# Patient Record
Sex: MALE | Race: WHITE | NOT HISPANIC OR LATINO
[De-identification: names, ages, dates, MRNs, and addresses within clinical notes are randomized per-mention and may not be internally consistent; named-entity substitution may affect disease eponyms.]

---

## 2023-04-27 PROBLEM — Z00.00 ENCOUNTER FOR PREVENTIVE HEALTH EXAMINATION: Status: ACTIVE | Noted: 2023-04-27

## 2023-04-28 ENCOUNTER — NON-APPOINTMENT (OUTPATIENT)
Age: 53
End: 2023-04-28

## 2023-05-15 ENCOUNTER — APPOINTMENT (OUTPATIENT)
Dept: PULMONOLOGY | Facility: CLINIC | Age: 53
End: 2023-05-15
Payer: COMMERCIAL

## 2023-05-15 VITALS
HEART RATE: 63 BPM | HEIGHT: 73 IN | SYSTOLIC BLOOD PRESSURE: 122 MMHG | DIASTOLIC BLOOD PRESSURE: 74 MMHG | WEIGHT: 235 LBS | BODY MASS INDEX: 31.14 KG/M2 | RESPIRATION RATE: 16 BRPM | OXYGEN SATURATION: 97 %

## 2023-05-15 DIAGNOSIS — G47.33 OBSTRUCTIVE SLEEP APNEA (ADULT) (PEDIATRIC): ICD-10-CM

## 2023-05-15 DIAGNOSIS — Z87.39 PERSONAL HISTORY OF OTHER DISEASES OF THE MUSCULOSKELETAL SYSTEM AND CONNECTIVE TISSUE: ICD-10-CM

## 2023-05-15 DIAGNOSIS — K21.9 GASTRO-ESOPHAGEAL REFLUX DISEASE W/OUT ESOPHAGITIS: ICD-10-CM

## 2023-05-15 PROCEDURE — 99204 OFFICE O/P NEW MOD 45 MIN: CPT

## 2023-05-15 NOTE — ASSESSMENT
[FreeTextEntry1] : The patient has signs and symptoms of obstructive sleep apnea.  I discussed the pathophysiology of obstructive sleep apnea with the patient and its association with the patient's symptomatology and comorbidities.  The patient will undergo a polysomnogram and I will see him back after that.  Treatment options were discussed.  If he has significant desaturation with sleep apnea then he would need CPAP.  If sleep apnea is present without significant desaturation, he can be tried on oral appliance therapy.

## 2023-05-15 NOTE — HISTORY OF PRESENT ILLNESS
[TextBox_4] : The patient is a 52-year-old male who comes for sleep evaluation.  He has a long history of snoring and his wife reports apneas.  He is excessively sleepy during the daytime.  He was sent by his hematologist.  The patient exhibits polycythemia.  Work-up has been otherwise unremarkable and he has been requiring periodic phlebotomy.  The patient reports that a few months ago he underwent endoscopy and was found to be desaturating during the procedure.  He never smoked and denies shortness of breath.\par \par Generally the patient gets into bed at 10 PM and falls asleep immediately.  He will wake up at least 2 times a night that he knows of.  Wakes up for good at 6 AM feeling unrefreshed, sometimes with headaches, and always with a dry mouth.  He drinks about 2 cups of coffee per day.  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 [ESS] : 16

## 2023-05-15 NOTE — PHYSICAL EXAM
[No Acute Distress] : no acute distress [Elongated Uvula] : elongated uvula [II] : Mallampati Class: II [Normal Appearance] : normal appearance [No Neck Mass] : no neck mass [Normal Rate/Rhythm] : normal rate/rhythm [Normal S1, S2] : normal s1, s2 [No Murmurs] : no murmurs [No Resp Distress] : no resp distress [Clear to Auscultation Bilaterally] : clear to auscultation bilaterally [No Abnormalities] : no abnormalities [Benign] : benign [Normal Gait] : normal gait [No Clubbing] : no clubbing [No Cyanosis] : no cyanosis [No Edema] : no edema [FROM] : FROM [Normal Color/ Pigmentation] : normal color/ pigmentation [No Focal Deficits] : no focal deficits [Oriented x3] : oriented x3 [Normal Affect] : normal affect [TextBox_11] : Lateral ridging to tongue

## 2023-05-15 NOTE — CONSULT LETTER
[Dear  ___] : Dear  [unfilled], [Consult Letter:] : I had the pleasure of evaluating your patient, [unfilled]. [Please see my note below.] : Please see my note below. [Consult Closing:] : Thank you very much for allowing me to participate in the care of this patient.  If you have any questions, please do not hesitate to contact me. [Sincerely,] : Sincerely, [FreeTextEntry3] : Dahlia Carrillo MD FCCP\par D-ABSM\par [employer] board certified in  Pulmonary diseases, Sleep medicine\par Internal medicine\par \par [facility] [place]\par

## 2023-06-05 ENCOUNTER — OUTPATIENT (OUTPATIENT)
Dept: OUTPATIENT SERVICES | Facility: HOSPITAL | Age: 53
LOS: 1 days | End: 2023-06-05
Payer: COMMERCIAL

## 2023-06-05 DIAGNOSIS — G47.33 OBSTRUCTIVE SLEEP APNEA (ADULT) (PEDIATRIC): ICD-10-CM

## 2023-06-05 PROCEDURE — 95810 POLYSOM 6/> YRS 4/> PARAM: CPT | Mod: 26

## 2023-06-05 PROCEDURE — 95810 POLYSOM 6/> YRS 4/> PARAM: CPT

## 2023-06-22 ENCOUNTER — APPOINTMENT (OUTPATIENT)
Dept: PULMONOLOGY | Facility: CLINIC | Age: 53
End: 2023-06-22
Payer: COMMERCIAL

## 2023-06-22 DIAGNOSIS — D75.1 SECONDARY POLYCYTHEMIA: ICD-10-CM

## 2023-06-22 PROCEDURE — 99213 OFFICE O/P EST LOW 20 MIN: CPT | Mod: 95

## 2023-06-22 NOTE — HISTORY OF PRESENT ILLNESS
[Medical Office: ([facility])___] : at the medical office located in  [Verbal consent obtained from patient] : the patient, [unfilled] [TextBox_4] : The patient is a 52-year-old male who comes for sleep evaluation.  He has a long history of snoring and his wife reports apneas.  He is excessively sleepy during the daytime.  He was sent by his hematologist.  The patient exhibits polycythemia.  Work-up has been otherwise unremarkable and he has been requiring periodic phlebotomy.  The patient reports that a few months ago he underwent endoscopy and was found to be desaturating during the procedure.  He never smoked and denies shortness of breath.\par \par Generally the patient gets into bed at 10 PM and falls asleep immediately.  He will wake up at least 2 times a night that he knows of.  Wakes up for good at 6 AM feeling unrefreshed, sometimes with headaches, and always with a dry mouth.  He drinks about 2 cups of coffee per day.  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 [Snoring] : snoring [Home] : home [TextBox_100] : 6/23 [TextBox_108] : 3.5 [TextBox_112] : 100 [TextBox_116] : 90 [TextBox_120] : mild snore [TextBox_165] : I reviewed the patient's sleep study with the patient.\par  [ESS] : 16

## 2023-06-22 NOTE — ASSESSMENT
[FreeTextEntry1] : Patient does not demonstrate significant obstructive sleep apnea.  There were no desaturations that could account for polycythemia.  I told the patient he can get an over-the-counter mouthpiece for snoring.  I explained to him that he should speak to his hematologist regarding these results.  Follow-up as needed.